# Patient Record
Sex: FEMALE | Race: OTHER
[De-identification: names, ages, dates, MRNs, and addresses within clinical notes are randomized per-mention and may not be internally consistent; named-entity substitution may affect disease eponyms.]

---

## 2020-02-28 ENCOUNTER — HOSPITAL ENCOUNTER (OUTPATIENT)
Dept: HOSPITAL 72 - GAS | Age: 35
Discharge: HOME | End: 2020-02-28
Payer: COMMERCIAL

## 2020-02-28 VITALS — DIASTOLIC BLOOD PRESSURE: 89 MMHG | SYSTOLIC BLOOD PRESSURE: 131 MMHG

## 2020-02-28 VITALS — DIASTOLIC BLOOD PRESSURE: 90 MMHG | SYSTOLIC BLOOD PRESSURE: 125 MMHG

## 2020-02-28 VITALS — DIASTOLIC BLOOD PRESSURE: 86 MMHG | SYSTOLIC BLOOD PRESSURE: 124 MMHG

## 2020-02-28 VITALS — DIASTOLIC BLOOD PRESSURE: 87 MMHG | SYSTOLIC BLOOD PRESSURE: 130 MMHG

## 2020-02-28 VITALS — SYSTOLIC BLOOD PRESSURE: 124 MMHG | DIASTOLIC BLOOD PRESSURE: 85 MMHG

## 2020-02-28 VITALS — SYSTOLIC BLOOD PRESSURE: 127 MMHG | DIASTOLIC BLOOD PRESSURE: 89 MMHG

## 2020-02-28 VITALS — DIASTOLIC BLOOD PRESSURE: 91 MMHG | SYSTOLIC BLOOD PRESSURE: 125 MMHG

## 2020-02-28 VITALS — WEIGHT: 120 LBS | HEIGHT: 60 IN | BODY MASS INDEX: 23.56 KG/M2

## 2020-02-28 VITALS — SYSTOLIC BLOOD PRESSURE: 127 MMHG | DIASTOLIC BLOOD PRESSURE: 86 MMHG

## 2020-02-28 DIAGNOSIS — K29.50: ICD-10-CM

## 2020-02-28 DIAGNOSIS — K21.9: ICD-10-CM

## 2020-02-28 DIAGNOSIS — E11.9: ICD-10-CM

## 2020-02-28 DIAGNOSIS — F41.9: ICD-10-CM

## 2020-02-28 DIAGNOSIS — M19.90: ICD-10-CM

## 2020-02-28 DIAGNOSIS — F32.9: ICD-10-CM

## 2020-02-28 DIAGNOSIS — E78.5: ICD-10-CM

## 2020-02-28 DIAGNOSIS — I10: ICD-10-CM

## 2020-02-28 DIAGNOSIS — R10.9: Primary | ICD-10-CM

## 2020-02-28 DIAGNOSIS — R13.10: ICD-10-CM

## 2020-02-28 PROCEDURE — 43239 EGD BIOPSY SINGLE/MULTIPLE: CPT

## 2020-02-28 PROCEDURE — 94150 VITAL CAPACITY TEST: CPT

## 2020-02-28 PROCEDURE — 94003 VENT MGMT INPAT SUBQ DAY: CPT

## 2020-02-28 PROCEDURE — 81025 URINE PREGNANCY TEST: CPT

## 2020-02-28 NOTE — ENDOSCOPY PROCEDURE NOTE
Endoscopy Procedure Note


General


Indication for Procedure:  Abdominal pains/


Procedures Performed:  EGD - Significant amount of bile in gastric cavity with 

bile gastritis. Biopsies obtained from duodenal bulb and mid gastric areas.


Operative Findings/Diagnosis:  Bile gastriis


Specimen:  yes


Pt Tolerated Procedure Well:  Yes


Estimated Blood Loss:  none





Anesthesia


Anesthesiologist:  Dr. Edwards


Anesthesia:  moderate sedation





Medications


Medication Given:  see anesthesia record





Inserted Devices


Implant(s) used?:  No





Quality


Quality of Bowel Preparation:  Good


Was there any complications?:  No





GI Core Measures


50 yrs or older w/o bx or poly:  Not Applicable


10yrs. F/U recommended:  Not Applicable


If not recommended, why?:  


Med reason:<3 yrs.:  


System Reason:<3 yrs.:  











Karen Parker MD Feb 28, 2020 09:00

## 2020-02-28 NOTE — ANETHESIA PREOPERATIVE EVAL
Anesthesia Pre-op PMH/ROS


General


Date of Evaluation:  Feb 28, 2020


Time of Evaluation:  07:28


Anesthesiologist:  tej


ASA Score:  ASA 3


Mallampati Score


Class I : Soft palate, uvula, fauces, pillars visible


Class II: Soft palate, uvula, fauces visible


Class III: Soft palate, base of uvula visible


Class IV: Only hard plate visible


Mallampati Classification:  Class II


Surgeon:  rhiannon


Diagnosis:  gerd


Surgical Procedure:  egd


Anesthesia History:  none


Social History:  smoking - nonsmoker


Family History:  no anesthesia problems


Allergies:  


Coded Allergies:  


     No Known Allergies (Unverified , 2/28/20)


Medications:  see eMAR


Patient NPO?:  Yes





Past Medical History


Cardiovascular:  Reports: HTN, other - hyperlipidemia


Gastrointestinal/Genitourinary:  Reports: GERD, other - lmp 2/21/2020


Neurologic/Psychiatric:  Reports: depression/anxiety


Endocrine:  Reports: DM


Musculoskeletal/Integumentary:  Reports: OA


PSxH Narrative:


hernia repair





Anesthesia Pre-op Phys. Exam


Physician Exam





Last Vital Signs








  Date Time  Temp Pulse Resp B/P (MAP) Pulse Ox O2 Delivery O2 Flow Rate FiO2


 


2/28/20 08:30      Room Air  


 


2/28/20 08:22 97.0 69 18 127/86 100   








Constitutional:  NAD


Neurologic:  CN 2-12 intact


Cardiovascular:  RRR


Respiratory:  CTA


Gastrointestinal:  S/NT/ND





Airway Exam


Mallampati Score:  Class II


MO:  full


Neck:  flexible


TMD:  2fb


ROM:  limited





Anesthesia Pre-op A/P


Labs





Labs








Test


  2/28/20


08:06


 


Urine HCG, Qualitative


  Negative


(NEGATIVE)











Risk Assessment & Plan


Assessment:


asa3


Plan:


mac


Status Change Before Surgery:  No





Pre-Antibiotics


Drug:  Manda Freeman MD Feb 28, 2020 07:28

## 2020-02-28 NOTE — OPERATIVE NOTE - DICTATED
DATE OF OPERATION:  02/28/2020

SURGEON:  Karen Parker M.D.



PROCEDURE:  Esophagogastroduodenoscopy with biopsy.



PREOPERATIVE DIAGNOSES:  Abdominal pain, dysphagia, and gastroesophageal

reflux, rule out peptic ulcer disease.



POSTOPERATIVE DIAGNOSIS:  Moderate bile gastritis.  Biopsies were taken

from duodenal bulb and gastric body.



MEDICATION USED:  Per Dr. Shields, anesthesiologist.



INSTRUMENT:  GIF Olympus upper GI video endoscope.



DESCRIPTION OF PROCEDURE:  The patient after arriving in the endoscopy

unit, was told about risks and benefits of the procedure, which she

accepted and signed informed consent.  At this time, she was put on the

left lateral decubitus position.  After adequate IV sedation, the scope

was then gently passed through the cricopharyngeal area, was lodged into

the upper esophagus and gradually advanced towards gastroesophageal

junction.  The entire length of the esophagus looked normal.  No evidence

of any pathology, inflammatory process, ulceration, or stricture, etc. was

found.  GE junction also looked normal without the presence of any hiatal

hernia or Salter's.



At this time, the scope was advanced into the stomach, which revealed

significant amount of bile material in the gastric body, which had to be

suctioned constantly.  Underlying mucosa revealed evidence of moderate

inflammatory process consistent with moderate gastritis, but there was no

ulcers, tumors, polyps, bleeding site, etc.  A retroflexion maneuver was

also applied.  The area of the gastroesophageal junction was examined in a

closer fashion, which revealed no other abnormalities except what is

stated earlier.  At this point, the scope was gradually advanced through

the mid body and the antrum and pylorus and first and second portion of

duodenum were examined that they looked normal.  At this point, the scope

was gradually pulled out and one biopsy from duodenum and the other one

from mid gastric body was obtained, and subsequently the procedure was

terminated.  The patient tolerated the procedure well and left the

endoscopy room in a good condition.









  ______________________________________________

  Karen Parker M.D.





DR:  KATARZYNA

D:  02/28/2020 09:13

T:  02/28/2020 14:53

JOB#:  [id number]

CC:

## 2020-02-28 NOTE — DISCHARGE INSTRUCTIONS
Discharge Instructions


Discharge Instructions


Follow up with:  Call doctor office for making appointment after 2 weeks





For Congestive Heart Failure


Reminder


Report to your physician any weight gain of 5 pounds or more in one week.











Karen Parker MD Feb 28, 2020 08:02

## 2020-02-28 NOTE — 48 HOUR POST ANESTHESIA EVAL
Post Anesthesia Evaluation


Procedure:  egd w/bx


Date of Evaluation:  Feb 28, 2020


Time of Evaluation:  09:20


Blood Pressure Systolic:  118


0:  79


Pulse Rate:  72


Respiratory Rate:  18


Temperature (Fahrenheit):  97.0


O2 Sat by Pulse Oximetry:  100


Airway:  patent


Nausea:  No


Vomiting:  No


Pain Intensity:  0


Hydration Status:  adequate


Cardiopulmonary Status:


stable


Mental Status/LOC:  patient returned to baseline


Post-Anesthesia Complications:


none


Follow-up care needed:  N/A











Manda Edwards MD Feb 28, 2020 09:20

## 2020-02-28 NOTE — PRE-PROCEDURE NOTE/ATTESTATION
Pre-Procedure Note/Attestation


Complete Prior to Procedure


Planned Procedure:  left


Procedure Narrative:


Examination of the upper GI. tract via endoscopy with obtaining biopsy.





Indications for Procedure


Pre-Operative Diagnosis:


R/O Gastritis/peptic ulcer/esophagitis





Attestation


I attest that I discussed the nature of the procedure; its benefits; risks and 

complications; and alternatives (and the risks and benefits of such alternatives

), prior to the procedure, with the patient (or the patient's legal 

representative).





I attest that, if there was a reasonable possibility of needing a blood 

transfusion, the patient (or the patient's legal representative) was given the 

[facility] standardized written summary, pursuant 

to the Bill Gilberto Blood Safety Act ([place] Health and Safety Code # 1645, as 

amended).





I attest that I re-evaluated the patient just prior to the surgery and that 

there has been no change in the patient's H&P, except as documented below:











Karen Parker MD Feb 28, 2020 07:45

## 2020-02-28 NOTE — IMMEDIATE POST-OP EVALUATION
Immediate Post-Op Evalulation


Immediate Post-Op Evalulation


Procedure:  egd w/bx


Date of Evaluation:  Feb 28, 2020


Time of Evaluation:  09:18


IV Fluids:  375ml lr


Blood Products:  none


Estimated Blood Loss:  negligible


Blood Pressure Systolic:  125


Blood Pressure Diastolic:  91


Pulse Rate:  72


Respiratory Rate:  18


O2 Sat by Pulse Oximetry:  100


Temperature (Fahrenheit):  97.0


Pain Score (1-10):  0


Nausea:  No


Vomiting:  No


Complications


none


Patient Status:  awake, reacts, patent


Hydration Status:  adequate


Drug:  Manda Freeman MD Feb 28, 2020 09:18

## 2020-02-28 NOTE — PRE-OP HX & PHY REPO 2 SIG
DATE OF ADMISSION:  02/28/2020

HISTORY OF PRESENT ILLNESS:  The patient is a 34-year-old, non-English

speaking female who is being seen prior to undergoing the procedure of

upper GI endoscopy for which she has been scheduled to receive for

evaluation of her gastrointestinal condition that she has been reporting.

The applicant basically was seen in my office approximately 3 to 4 months

ago when she was complaining of experiencing pain over the epigastric

area, which radiates towards the chest section.  She still continues to

have the same symptoms today with feeling significant amount of

gastroesophageal acid reflux that she has to deal with on a daily basis.

She has been treated with multiple medications including currently

omeprazole, which does not seem to be quite effective.  She reports that

while she was working for the employer, which was the company of [employer].  During the process of work, she got injured and the

injury was basically on her right hand on the lower back area for which

she had to be seen by multiple physicians including orthopedist and

received numerous medications including nonsteroidal anti-inflammatory

agents.  She had to continue these medications as well as being followed

with her primary caregivers at [facility] receiving nonsteroidal

anti-inflammatory agents and opioids.  She reports that subsequent to

taking these medications, she started to have increasing symptoms in the

abdomen as mentioned earlier along with difficulty swallowing consistent

with dysphagia.  She denies having any nausea or vomiting at this time.

There is no hematemesis, melena, hematochezia, or rectal bleeding, etc.

She also complains of occasional diarrhea.  She reported to me that she

also does have eructation.  She reports that she never had any

gastrointestinal conditions before being injured at job site.  At this

time, she denies having any chest pain or shortness of breath, etc.



PAST MEDICAL HISTORY:  The patient has had history of hypertension,

hyperlipidemia, arthritis, and diabetes mellitus for which she has been

taking medications currently.



PAST SURGICAL HISTORY:  The patient has had history of hernia operation.



SOCIAL HISTORY:  She denies drinking alcohol or smoking cigarettes.



MEDICATIONS:  Current medications are Vytorin and omeprazole and Jardiance

and metformin.



REVIEW OF SYSTEMS:  Basically history of present illness.  The patient

basically complains of nonspecific headaches with occasional cough.  She

denies having any chest pain or shortness of breath at this time.  Also,

she is complaining of having pain continuing over her lower back area.



PHYSICAL EXAMINATION:

GENERAL:  At this time reveals alert, well oriented, very pleasant female,

who does not seem to be in any acute distress.  She looks well developed

and nourished.

VITAL SIGNS:  Blood pressure 127/86, pulse rate 69 per minute, temperature

97, respiratory rate 18, and oxygen saturations 100% on room air.

HEENT:  Normocephalic.  Pupils equal in size and reactive to light and

accommodation.  No visible jaundice.  Buccal cavity, tongue midline, well

hydrated.  No ulcers.

NECK:  Supple.  No JVD, thyromegaly, adenopathy, masses, etc.

CHEST:  Clear to auscultation and percussion.  No rales or rhonchi.

HEART:  S1, S2 normal.  Regular rate and rhythm.  No gallops or murmur.

ABDOMEN:  Soft, but areas of moderate tenderness over the epigastric area.

There is no organomegaly.  No palpable mass noted.  Bowel sounds are

present.

EXTREMITIES:  Within normal limits and there was no any evidence of

pretibial edema or cyanosis, etc.



INITIAL PREOPERATIVE DIAGNOSES:

1. Epigastric pain of uncertain etiology, rule out gastroesophageal acid

reflux aggravated by use of NSAID medications, rule out NSAID-induced

gastritis or peptic ulcer disease.

2. Diabetes mellitus, hypertension, hyperlipidemia, arthritis.

3. Depression and anxiety.

4. Dysphagia, possibly secondary to gastroesophageal acid reflux, rule

out esophagitis.



RECOMMENDATIONS:  The applicant at this time seems to be quite stable to

undergo the procedure of upper GI endoscopy for which she has been

scheduled.  She understands the risks and benefits and will sign the

consent.









  ______________________________________________

  Said ELLIOTT Parker





DR:  JIMMIE

D:  02/28/2020 09:10

T:  02/28/2020 14:43

JOB#:  [id number]

CC:

## 2020-02-28 NOTE — SHORT STAY SURGERY H&P
History of Present Illness


History of Present Illness


Chief Complaint


Abdominal pains/dysphagia/GERD


HPI


Malinda Nair is a 34 year old female who was admitted on  for GERDS, abdominal 

pains and dysphagia





Patient History


Allergies:  


Coded Allergies:  


     No Known Allergies (Unverified , 2/26/20)


PAST MEDICAL HISTORY:  


(1) Diabetes


(2) Hypertension


(3) Acute arthritis


(4) Hyperlipidemia


(5) History of hernia repair





Review of Systems


Cardiovascular:  Reports: hypertension


Respiratory:  Reports: no symptoms


Skeletal:  Reports: trauma


Gastrointestinal:  Reports: gastro esophageal reflux disease


Genitourinary:  Reports: no symptoms


Neurologic:  Reports: no symptoms


Endocrine:  Reports: diabetes - type 2


Hematologic:  Reports: anemia





Physical Exam


Skin:  normal


HENT:  normal


Heart:  normal


Lungs:  normal


Abdomen:  abnormal


Extremities:  normal


Genitourinary:  normal





Plan


Plan of Care


Upper GI endoscopy with biopsy


Preop Interventions


None.


Summary of Findings


See the reports


Attestation


Are the patient's medical conditions optimized for surgery?


Attestation Response:  yes











Karen Parker MD Feb 28, 2020 07:44